# Patient Record
(demographics unavailable — no encounter records)

---

## 2024-11-20 NOTE — IMAGING
[de-identified] : left wrist: wound clean dry and intact no erythema no drainage FAROM NV unchanged sutures removed

## 2024-11-20 NOTE — HISTORY OF PRESENT ILLNESS
[Dull/Aching] : dull/aching [Localized] : localized [Tightness] : tightness [de-identified] : DOS: 11/08/24- LEFT WRIST HOOK OF HAMATE EXCISION  11/20/24: Patient is here today for PO/FU, feeling okay since surgery  9/18/24: injured left wrist swinging a bat in June 2024 and has had pain since. Pt reports that brace is giving mild improvement.  [facility] CT report taken on 9/5/24 was reviewed in office today: Images reviewed by me today. My independent interpretation of this test is hamate fx xray taken on 8/30/24 was reviewed in office today Images reviewed by me today. My independent interpretation of this test is left hook of the hamate fx  Reviewed Dr Strickland note: 15 year old male presents LEFT ulnar sided wrist pain for 2 months. pt plays baseball and pain increased throughout the season. pt has hx of left wrist fx 2x. slight swelling.   [FreeTextEntry1] : LFT wrist  [FreeTextEntry5] : LFT wrist pain and had seen dr. garrido 3 weeks ago. Patient states pain has not improved since last visit.

## 2024-11-20 NOTE — ASSESSMENT
[FreeTextEntry1] : The patient was advised of the diagnosis. The natural history of the pathology was explained in full to the patient in layman's terms. All questions were answered. The risks and benefits of surgical and non-surgical treatment alternatives were explained in full to the patient.  Wound care discussed PT Rx provided  RTO PRN